# Patient Record
Sex: FEMALE | URBAN - METROPOLITAN AREA
[De-identification: names, ages, dates, MRNs, and addresses within clinical notes are randomized per-mention and may not be internally consistent; named-entity substitution may affect disease eponyms.]

---

## 2020-07-23 ENCOUNTER — APPOINTMENT (OUTPATIENT)
Dept: URBAN - METROPOLITAN AREA SURGERY 9 | Age: 35
Setting detail: DERMATOLOGY
End: 2020-07-23

## 2020-07-23 PROBLEM — D48.5 NEOPLASM OF UNCERTAIN BEHAVIOR OF SKIN: Status: ACTIVE | Noted: 2020-07-23

## 2020-07-23 PROCEDURE — OTHER PATHOLOGY BILLING: OTHER

## 2020-07-23 PROCEDURE — OTHER BIOPSY BY SHAVE METHOD: OTHER

## 2020-07-23 PROCEDURE — OTHER OTHER: OTHER

## 2020-07-23 PROCEDURE — 11102 TANGNTL BX SKIN SINGLE LES: CPT

## 2020-07-23 PROCEDURE — 88305 TISSUE EXAM BY PATHOLOGIST: CPT | Mod: TC

## 2020-07-23 PROCEDURE — OTHER MIPS QUALITY: OTHER

## 2020-07-23 NOTE — PROCEDURE: PATHOLOGY BILLING
Immunohistochemistry (01419 and [id number]) billing is not performed here. Please use the Immunohistochemistry Stain Billing plan to accomplish this. Immunohistochemistry ([id number] and [id number]) billing is not performed here. Please use the Immunohistochemistry Stain Billing plan to accomplish this.

## 2020-07-23 NOTE — PROCEDURE: OTHER
Note Text (......Xxx Chief Complaint.): This diagnosis correlates with the
Other (Free Text): Procedure, scaring, expectations were discussed with the patient. \\n\\nShe states that this rapidly continues to grow and is tender.  No h/o skin cancer in the past.  \\nWe discussed biopsy and excision.  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  We discussed needing to confirm the entity and then we will develop a treatment plan.  \\n\\nDiscussed initially excising, but this will be costly for her and may necessitate another costly procedure once lesion and margins are confirmed.  I believe that a better approach for her would be to biopsy the lesion in its entirety today, confirm what this is and develop a treatment plan for her afterwards.  I considered a partial biopsy, but with this rapidly growing and concern for not giving the pathologist the entire lesion to examine, I felt it was best to remove all that I could clinically discern.  We discussed wound care and reassured her that she can call us at any time with concerns.  I reassured her that she is in a difficult time with recently moving, having this lesion arise and having difficulty establishing herself with a practice that she can call us with any concerns whatsoever.  I will be calling her with results to discuss and treatment plan.
Detail Level: Simple

## 2020-07-23 NOTE — PROCEDURE: MIPS QUALITY
Additional Notes: No PCP
Quality 265: Biopsy Follow-Up: Documentation of Patient OR System Reason(s) for not Performing up to Three of the Four Components of the Numerator Instructions: Reviewing, Communicating, Tracking, and/or Documenting Biopsy Results, Patient not Eligible
Detail Level: Detailed

## 2020-07-23 NOTE — HPI: BUMPS
Is This A New Presentation, Or A Follow-Up?: Bump
Additional History: First noticed May 25th, went to ER one month ago, they said it needed removed but they could not do it there. This is enlarging, hard time getting in to see anyone. Possible Keratocanthoma per ER . She tried hot compresses at first thinking it was a boil, no improvement, may have made it worse.

## 2020-08-17 ENCOUNTER — APPOINTMENT (OUTPATIENT)
Dept: URBAN - METROPOLITAN AREA SURGERY 9 | Age: 35
Setting detail: DERMATOLOGY
End: 2020-08-17

## 2020-08-17 PROBLEM — C44.529 SQUAMOUS CELL CARCINOMA OF SKIN OF OTHER PART OF TRUNK: Status: ACTIVE | Noted: 2020-08-17

## 2020-08-17 PROCEDURE — OTHER RETURN TO REFERRING PROVIDER: OTHER

## 2020-08-17 PROCEDURE — OTHER CONSULTATION EXCISION: OTHER

## 2020-08-17 PROCEDURE — 12034 INTMD RPR S/TR/EXT 7.6-12.5: CPT

## 2020-08-17 PROCEDURE — 11604 EXC TR-EXT MAL+MARG 3.1-4 CM: CPT

## 2020-08-17 PROCEDURE — OTHER EXCISION: OTHER

## 2020-08-17 NOTE — PROCEDURE: EXCISION
Post-Care Instructions: I reviewed with the patient in detail post-care instructions. Patient is not to engage in any heavy lifting, exercise, or swimming for the next 14 days. Should the patient develop any fevers, chills, bleeding, severe pain patient will contact the office immediately.  granulation wound care. Flap taped wound care.

## 2020-08-17 NOTE — PROCEDURE: EXCISION
Patient/Caregiver provided printed discharge information. Double O-Z Flap Text: The defect edges were debeveled with a #15 scalpel blade.  Given the location of the defect, shape of the defect and the proximity to free margins a Double O-Z flap was deemed most appropriate.  Using a sterile surgical marker, an appropriate transposition flap was drawn incorporating the defect and placing the expected incisions within the relaxed skin tension lines where possible. The area thus outlined was incised deep to adipose tissue with a #15 scalpel blade.  The skin margins were undermined to an appropriate distance in all directions utilizing iris scissors.

## 2020-08-17 NOTE — PROCEDURE: EXCISION
Addended by: EVAN JUAN on: 1/24/2019 11:39 AM     Modules accepted: Orders     Undermining Type: Entire Wound

## 2020-08-21 ENCOUNTER — RX ONLY (RX ONLY)
Age: 35
End: 2020-08-21

## 2020-08-21 RX ORDER — CEPHALEXIN 500 MG/1
CAPSULE ORAL
Qty: 21 | Refills: 0 | Status: ERX | COMMUNITY
Start: 2020-08-21

## 2020-08-24 ENCOUNTER — APPOINTMENT (OUTPATIENT)
Dept: URBAN - METROPOLITAN AREA SURGERY 9 | Age: 35
Setting detail: DERMATOLOGY
End: 2020-08-24

## 2020-08-24 DIAGNOSIS — Z48.02 ENCOUNTER FOR REMOVAL OF SUTURES: ICD-10-CM

## 2020-08-24 DIAGNOSIS — L259 CONTACT DERMATITIS AND OTHER ECZEMA, UNSPECIFIED CAUSE: ICD-10-CM

## 2020-08-24 DIAGNOSIS — G89.18 OTHER ACUTE POSTPROCEDURAL PAIN: ICD-10-CM

## 2020-08-24 PROBLEM — L23.9 ALLERGIC CONTACT DERMATITIS, UNSPECIFIED CAUSE: Status: ACTIVE | Noted: 2020-08-24

## 2020-08-24 PROCEDURE — 99024 POSTOP FOLLOW-UP VISIT: CPT

## 2020-08-24 PROCEDURE — OTHER SUTURE REMOVAL (GLOBAL PERIOD): OTHER

## 2020-08-24 PROCEDURE — OTHER PRESCRIPTION: OTHER

## 2020-08-24 PROCEDURE — OTHER OTHER: OTHER

## 2020-08-24 RX ORDER — TRIAMCINOLONE ACETONIDE 1 MG/G
CREAM TOPICAL
Qty: 1 | Refills: 0 | Status: ERX | COMMUNITY
Start: 2020-08-24

## 2020-08-24 RX ORDER — HYDROCODONE BITARTRATE AND ACETAMINOPHEN 5; 325 MG/1; MG/1
TABLET ORAL
Qty: 6 | Refills: 0 | COMMUNITY
Start: 2020-08-24

## 2020-08-24 ASSESSMENT — LOCATION DETAILED DESCRIPTION DERM: LOCATION DETAILED: LEFT LATERAL SUPERIOR CHEST

## 2020-08-24 ASSESSMENT — LOCATION ZONE DERM: LOCATION ZONE: TRUNK

## 2020-08-24 ASSESSMENT — LOCATION SIMPLE DESCRIPTION DERM: LOCATION SIMPLE: CHEST

## 2020-08-24 NOTE — PROCEDURE: SUTURE REMOVAL (GLOBAL PERIOD)
Body Location Override (Optional - Billing Will Still Be Based On Selected Body Map Location If Applicable): left clavicular
Detail Level: Zone
Add 28411 Cpt? (Important Note: In 2017 The Use Of 08207 Is Being Tracked By Cms To Determine Future Global Period Reimbursement For Global Periods): yes

## 2020-08-24 NOTE — PROCEDURE: OTHER
Detail Level: Detailed
Other (Free Text): based on clinical presentation of well-defined rectangular erythema immediate around surgical area, corresponding to where tape dressing was applied, suspect allergy to adhesive/mastisol/tape. No drainage elicited from surgical site. Start TAC cream, avoid tape to area. Well-defined erythema marked today so that pt can monitor for expansion and notify us if she notes increase beyond marked borders. Cont Keflex as prescribed. Recheck site in 2 days
Note Text (......Xxx Chief Complaint.): This diagnosis correlates with the
Other (Free Text): pain not adequately controlled with Tylenol and pt requesting additional pain management medication. Has difficulty sleeping due to discomfort

## 2020-08-26 ENCOUNTER — APPOINTMENT (OUTPATIENT)
Dept: URBAN - METROPOLITAN AREA SURGERY 9 | Age: 35
Setting detail: DERMATOLOGY
End: 2020-08-26

## 2020-08-26 DIAGNOSIS — L259 CONTACT DERMATITIS AND OTHER ECZEMA, UNSPECIFIED CAUSE: ICD-10-CM

## 2020-08-26 PROBLEM — L23.9 ALLERGIC CONTACT DERMATITIS, UNSPECIFIED CAUSE: Status: ACTIVE | Noted: 2020-08-26

## 2020-08-26 PROCEDURE — OTHER OTHER: OTHER

## 2020-08-26 PROCEDURE — 99024 POSTOP FOLLOW-UP VISIT: CPT

## 2020-08-26 NOTE — PROCEDURE: OTHER
Note Text (......Xxx Chief Complaint.): This diagnosis correlates with the
Detail Level: Detailed
Other (Free Text): Patient improved, continue with triamcinolone as directed   Follow-up as needed. Area of redness receding from previous line of demarcation. Pt instructed to contact us if redness or pain worsens. Finish Keflex as prescribed.

## 2020-08-27 ENCOUNTER — APPOINTMENT (OUTPATIENT)
Dept: URBAN - METROPOLITAN AREA SURGERY 9 | Age: 35
Setting detail: DERMATOLOGY
End: 2020-08-27

## 2020-08-27 DIAGNOSIS — T814XXA OTHER POSTOPERATIVE INFECTION: ICD-10-CM

## 2020-08-27 DIAGNOSIS — K6811 OTHER POSTOPERATIVE INFECTION: ICD-10-CM

## 2020-08-27 PROBLEM — T81.40XA INFECTION FOLLOWING A PROCEDURE, UNSPECIFIED, INITIAL ENCOUNTER: Status: ACTIVE | Noted: 2020-08-27

## 2020-08-27 PROCEDURE — OTHER POST-OP WOUND CHECK: OTHER

## 2020-08-27 PROCEDURE — OTHER PRESCRIPTION: OTHER

## 2020-08-27 PROCEDURE — 99024 POSTOP FOLLOW-UP VISIT: CPT

## 2020-08-27 PROCEDURE — OTHER ORDER TESTS: OTHER

## 2020-08-27 RX ORDER — DOXYCYCLINE HYCLATE 100 MG/1
TABLET, COATED ORAL BID
Qty: 20 | Refills: 0 | Status: ERX | COMMUNITY
Start: 2020-08-27

## 2020-08-27 RX ORDER — MUPIROCIN 20 MG/G
OINTMENT TOPICAL
Qty: 1 | Refills: 0 | Status: ERX | COMMUNITY
Start: 2020-08-27

## 2020-08-27 ASSESSMENT — LOCATION DETAILED DESCRIPTION DERM: LOCATION DETAILED: LEFT CLAVICULAR SKIN

## 2020-08-27 ASSESSMENT — LOCATION SIMPLE DESCRIPTION DERM: LOCATION SIMPLE: LEFT CLAVICULAR SKIN

## 2020-08-27 ASSESSMENT — LOCATION ZONE DERM: LOCATION ZONE: TRUNK

## 2020-08-27 NOTE — PROCEDURE: POST-OP WOUND CHECK
Detail Level: Zone
Add 17891 Cpt? (Important Note: In 2017 The Use Of 81480 Is Being Tracked By Cms To Determine Future Global Period Reimbursement For Global Periods): yes
Wound Evaluated By: Dr. Marie
Additional Comments: milky drainage from incision (small amount): obtained culture of drainage. Site tender to touch. Erythema improved from prior exam. Recommend to switch to doxycycline for potential MRSA infection given continued drainage despite Keflex x 6 days. d/x triamcinolone. Start mupirocin oint bid to incision line. Written instructions given to patient. Current erythema marked with gentian violet. Recheck site in 1 day.

## 2020-08-28 ENCOUNTER — APPOINTMENT (OUTPATIENT)
Dept: URBAN - METROPOLITAN AREA SURGERY 9 | Age: 35
Setting detail: DERMATOLOGY
End: 2020-08-31

## 2020-08-28 DIAGNOSIS — Z48.817 ENCOUNTER FOR SURGICAL AFTERCARE FOLLOWING SURGERY ON THE SKIN AND SUBCUTANEOUS TISSUE: ICD-10-CM

## 2020-08-28 PROCEDURE — 99024 POSTOP FOLLOW-UP VISIT: CPT

## 2020-08-28 PROCEDURE — OTHER POST-OP WOUND CHECK: OTHER

## 2020-08-28 PROCEDURE — OTHER OTHER: OTHER

## 2020-08-28 ASSESSMENT — LOCATION ZONE DERM: LOCATION ZONE: TRUNK

## 2020-08-28 ASSESSMENT — LOCATION SIMPLE DESCRIPTION DERM: LOCATION SIMPLE: CHEST

## 2020-08-28 ASSESSMENT — LOCATION DETAILED DESCRIPTION DERM: LOCATION DETAILED: LEFT LATERAL SUPERIOR CHEST

## 2020-08-28 NOTE — PROCEDURE: POST-OP WOUND CHECK
Body Location Override (Optional - Billing Will Still Be Based On Selected Body Map Location If Applicable): left clavicular
Detail Level: Zone
Add 95086 Cpt? (Important Note: In 2017 The Use Of 61785 Is Being Tracked By Cms To Determine Future Global Period Reimbursement For Global Periods): yes
Wound Evaluated By: Dr. Marie
Additional Comments: minimal to no tenderness to palpation today. Significantly less drainage compared to 1 day ago. Reviewed to continue doxycycline and mupirocin as prescribed and that we will follow up on culture results to see if any change in therapy is needed. If drainage, tenderness or redness worsens, pt understands to contact us. Recheck area in 3-5 days

## 2020-08-28 NOTE — PROCEDURE: OTHER
Note Text (......Xxx Chief Complaint.): This diagnosis correlates with the
Detail Level: Detailed
Other (Free Text): Addendum 8/31/20: Culture results received and show heavy growth MSSA, sensitive to tetracyclines. Continue doxycycline as prescribed. Left message with patient to inform of culture results, check on status.

## 2020-09-02 ENCOUNTER — APPOINTMENT (OUTPATIENT)
Dept: URBAN - METROPOLITAN AREA SURGERY 9 | Age: 35
Setting detail: DERMATOLOGY
End: 2020-09-02

## 2020-09-02 DIAGNOSIS — Z48.817 ENCOUNTER FOR SURGICAL AFTERCARE FOLLOWING SURGERY ON THE SKIN AND SUBCUTANEOUS TISSUE: ICD-10-CM

## 2020-09-02 PROCEDURE — OTHER OTHER: OTHER

## 2020-09-02 PROCEDURE — 99024 POSTOP FOLLOW-UP VISIT: CPT

## 2020-09-02 PROCEDURE — OTHER POST-OP WOUND CHECK: OTHER

## 2020-09-02 ASSESSMENT — LOCATION ZONE DERM: LOCATION ZONE: TRUNK

## 2020-09-02 ASSESSMENT — LOCATION SIMPLE DESCRIPTION DERM: LOCATION SIMPLE: LEFT CLAVICULAR SKIN

## 2020-09-02 ASSESSMENT — LOCATION DETAILED DESCRIPTION DERM: LOCATION DETAILED: LEFT CLAVICULAR SKIN

## 2020-09-02 NOTE — PROCEDURE: OTHER
Note Text (......Xxx Chief Complaint.): This diagnosis correlates with the
Detail Level: Detailed
Other (Free Text): Reviewed that scar will be suboptimal given infection and dehiscence of site. We will monitor closely. Briefly mentioned that scar revision options are available but recommend allowing area to heal and mature before evaluating for potential scar revision.

## 2020-09-02 NOTE — PROCEDURE: POST-OP WOUND CHECK
Body Location Override (Optional - Billing Will Still Be Based On Selected Body Map Location If Applicable): left clavicle
Detail Level: Zone
Add 02083 Cpt? (Important Note: In 2017 The Use Of 37632 Is Being Tracked By Cms To Determine Future Global Period Reimbursement For Global Periods): yes
Wound Evaluated By: Dr. Marie
Additional Comments: Less inflammation and less drainage compared with prior exam. Healthy granulation tissue at base of dehisced area and reviewed healing process and wound maturation. Expect approx 6 weeks for this to heal. Reviewed culture results with patient. Sutures x 2 within dehisced area were removed.

## 2020-09-28 ENCOUNTER — APPOINTMENT (OUTPATIENT)
Dept: URBAN - METROPOLITAN AREA SURGERY 9 | Age: 35
Setting detail: DERMATOLOGY
End: 2020-09-28

## 2020-09-28 DIAGNOSIS — Z48.817 ENCOUNTER FOR SURGICAL AFTERCARE FOLLOWING SURGERY ON THE SKIN AND SUBCUTANEOUS TISSUE: ICD-10-CM

## 2020-09-28 PROCEDURE — OTHER POST-OP WOUND CHECK: OTHER

## 2020-09-28 PROCEDURE — OTHER PRESCRIPTION: OTHER

## 2020-09-28 PROCEDURE — 99024 POSTOP FOLLOW-UP VISIT: CPT

## 2020-09-28 PROCEDURE — OTHER OTHER: OTHER

## 2020-09-28 RX ORDER — DOXYCYCLINE HYCLATE 100 MG/1
TABLET, COATED ORAL BID
Qty: 28 | Refills: 0 | Status: ERX | COMMUNITY
Start: 2020-09-28

## 2020-09-28 ASSESSMENT — LOCATION DETAILED DESCRIPTION DERM: LOCATION DETAILED: LEFT CLAVICULAR SKIN

## 2020-09-28 ASSESSMENT — LOCATION ZONE DERM: LOCATION ZONE: TRUNK

## 2020-09-28 ASSESSMENT — LOCATION SIMPLE DESCRIPTION DERM: LOCATION SIMPLE: LEFT CLAVICULAR SKIN

## 2020-09-28 NOTE — PROCEDURE: OTHER
Detail Level: Detailed
Other (Free Text): Pt instructed to contact us if redness or drainage worsens or if any increased pain, tenderness, fevers or chills\\n\\nf/u as set in 1 week, sooner prn
Note Text (......Xxx Chief Complaint.): This diagnosis correlates with the

## 2020-09-28 NOTE — PROCEDURE: POST-OP WOUND CHECK
Detail Level: Zone
Add 28879 Cpt? (Important Note: In 2017 The Use Of 11939 Is Being Tracked By Cms To Determine Future Global Period Reimbursement For Global Periods): yes
Wound Evaluated By: Dr. Marie
Additional Comments: 4-5 areas of discrete pockets consistent with spitting sutures.